# Patient Record
Sex: MALE | Race: WHITE | NOT HISPANIC OR LATINO | URBAN - METROPOLITAN AREA
[De-identification: names, ages, dates, MRNs, and addresses within clinical notes are randomized per-mention and may not be internally consistent; named-entity substitution may affect disease eponyms.]

---

## 2019-07-26 ENCOUNTER — OFFICE VISIT - HEALTHEAST (OUTPATIENT)
Dept: FAMILY MEDICINE | Facility: CLINIC | Age: 29
End: 2019-07-26

## 2019-07-26 ENCOUNTER — AMBULATORY - HEALTHEAST (OUTPATIENT)
Dept: FAMILY MEDICINE | Facility: CLINIC | Age: 29
End: 2019-07-26

## 2019-07-26 DIAGNOSIS — R05.9 COUGH: ICD-10-CM

## 2019-07-26 DIAGNOSIS — J32.9 BACTERIAL SINUSITIS: ICD-10-CM

## 2019-07-26 DIAGNOSIS — B96.89 BACTERIAL SINUSITIS: ICD-10-CM

## 2019-07-26 DIAGNOSIS — R09.81 NASAL CONGESTION: ICD-10-CM

## 2019-07-26 DIAGNOSIS — R06.2 WHEEZING: ICD-10-CM

## 2019-07-26 DIAGNOSIS — J20.8 VIRAL BRONCHITIS: ICD-10-CM

## 2019-07-26 RX ORDER — CETIRIZINE HYDROCHLORIDE 10 MG/1
TABLET ORAL
Status: SHIPPED | COMMUNITY
Start: 2019-07-26

## 2019-07-26 RX ORDER — PREDNISONE 10 MG/1
TABLET ORAL
Qty: 30 TABLET | Refills: 0 | Status: SHIPPED | OUTPATIENT
Start: 2019-07-26

## 2019-07-26 ASSESSMENT — MIFFLIN-ST. JEOR: SCORE: 2178.31

## 2021-05-30 NOTE — PROGRESS NOTES
Office Visit - New Patient   Eric Mccall   29 y.o.  male    Date of visit: 7/26/2019  Physician: Radha Wu CNP     Assessment and Plan   1. Nasal congestion     2. Wheezing  predniSONE (DELTASONE) 10 mg tablet   3. Cough     4. Bacterial sinusitis  amoxicillin-clavulanate (AUGMENTIN) 875-125 mg per tablet   5. Viral bronchitis  predniSONE (DELTASONE) 10 mg tablet         The following high BMI interventions were performed this visit: encouragement to exercise and weight monitoring    Return if symptoms worsen or fail to improve.     Chief Complaint   Chief Complaint   Patient presents with     Cough     wheezing, congestion, headache  x 3 days         Patient Profile   Social History     Social History Narrative     Not on file        Past Medical History   There is no problem list on file for this patient.      Past Surgical History  He has no past surgical history on file.     History of Present Illness   This 29 y.o. old who presents for evaluation of his wheezing and cough symptoms.  It is new to the clinic, he has not been seen in well over 5 years.  We briefly reviewed his medical, family and surgical history.  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  He does not drink alcohol or use illicit drugs, he does smoke half pack of cigarettes daily, he started smoking at the age of 23 and has no desire to quit.  He also struggles with seasonal allergy at times, no history of asthma.  He has not had a physical in many years, he is overdue for that along with fasting lab work and his tetanus vaccination.  He declined the tetanus vaccine today due to recent illness.  Onset of cold and wheezing symptoms started 4 days ago.  He reports a dry cough with a runny nose.  He denies any fever, chills, nausea, vomiting or diarrhea today.  He has been experiencing intermittent headaches with his symptoms that are intermittent.  He describes them as a  "dull, throbbing sensation that is aggravated with any activity.  He has been taking Zicam nasal spray and ibuprofen for his symptoms with minimal relief and is rating his head pain a 5 out of 10.  No recent travel, no recent exposure to illness that he knows of, however, 2 days before his symptoms started he was at a heavy metal concert so he could have contracted something there.  He is needing a return to work note as he missed most of his shifts this week and will return back to work on Monday, July 29 since he does not work the weekends. VSS, he has no other concerns today.     Review of Systems: A comprehensive review of systems was negative except as noted.     Medications and Allergies   Current Outpatient Medications   Medication Sig Dispense Refill     amoxicillin-clavulanate (AUGMENTIN) 875-125 mg per tablet Take 1 tablet by mouth 2 (two) times a day for 10 days. 20 tablet 0     cetirizine (ZYRTEC) 10 MG tablet Take by mouth.       predniSONE (DELTASONE) 10 mg tablet 40 mg daily x3 days, 30 mg daily x3 days, 20 mg daily x3 days, 10 mg daily x3 days. 30 tablet 0     No current facility-administered medications for this visit.      Allergies   Allergen Reactions     Cat Dander Itching     Shellfish Containing Products Hives        Family and Social History   History reviewed. No pertinent family history.     Social History     Tobacco Use     Smoking status: Current Every Day Smoker     Smokeless tobacco: Never Used   Substance Use Topics     Alcohol use: Never     Frequency: Never     Drug use: Never        Physical Exam   General Appearance:   A & O x4, NAD, pleasant    /80 (Patient Position: Sitting, Cuff Size: Adult Large)   Pulse 95   Temp 98.8  F (37.1  C)   Resp 13   Ht 5' 10\" (1.778 m)   Wt (!) 268 lb 5 oz (121.7 kg)   SpO2 98%   BMI 38.50 kg/m      EYES: Eyelids normal, conjunctiva, and sclera were mildly erythematous. Pupils were normal. Cornea, iris, and lens were normal " bilaterally.  HEAD, EARS, NOSE, MOUTH, AND THROAT: Head and face were normal. Hearing was normal to voice and the ears were normal to external exam. Nose appearance was normal and there was a red amount of thick, yellow-green nasal drainage bilaterally. Oropharynx was moderately erythematous, uvula midline and moderately swollen, no exudate or thrush  NECK: Neck appearance was normal. There were no neck masses and the thyroid was not enlarged.  RESPIRATORY: Breathing pattern was normal and the chest moved symmetrically.    Lung sounds with expiratory wheezing in all lung fields, no signs of respiratory distress  CARDIOVASCULAR: Heart rate and rhythm were normal.  S1 and S2 were normal and there were no extra sounds or murmurs.  GASTROINTESTINAL: The abdomen was normal in contour.  Bowel sounds were present.  Percussion detected no organ enlargement or tenderness.  Palpation detected no tenderness, mass, or enlarged organs.   LYMPHATIC: There were no enlarged nodes.  SKIN/HAIR/NAILS: Skin color was normal.  There were no skin lesions.  Hair and nails were normal.  NEUROLOGIC: The patient was alert and oriented to person, place, time, and circumstance. Speech was normal. Cranial nerves were normal. Motor strength was normal for age. The patient was normally coordinated.       Additional Information     Review and/or order of clinical lab tests:  Review and/or order of radiology tests:  Review and/or order of medicine tests:  Discussion of test results with performing physician:  Decision to obtain old records and/or obtain history from someone other than the patient:  Review and summarization of old records and/or obtaining history from someone other than the patient and.or discussion of case with another health care provider:  Independent visualization of image, tracing or specimen itself:    Time: total time spent with the patient was 30 minutes of which >50% was spent in counseling and coordination of care      Radha Wu, CNP  Family Nurse Practitioner  [facility]  [phone]

## 2021-06-03 VITALS — WEIGHT: 268.31 LBS | BODY MASS INDEX: 38.41 KG/M2 | HEIGHT: 70 IN

## 2021-11-22 NOTE — LETTER
Letter by Radha Wu NP at      Author: Radha Wu NP Service: -- Author Type: --    Filed:  Encounter Date: 7/26/2019 Status: (Other)         July 26, 2019     Patient: Eric Mccall   YOB: 1990   Date of Visit: 7/26/2019       To Whom It May Concern:    It is my medical opinion that Eric Mccall may return to work on Monday, July 29, 2019, no restrictions. Eric was seen in clinic today and treated with antibiotic therapy for his acute illness. He was unable to work from 07/23/19- 07/26/19 due to his symptoms.     If you have any questions or concerns, please don't hesitate to call.    Sincerely,        Electronically signed by Radha Wu NP        What Type Of Note Output Would You Prefer (Optional)?: Bullet Format Hpi Title: Evaluation of Skin Lesions How Severe Are Your Spot(S)?: mild Have Your Spot(S) Been Treated In The Past?: has not been treated